# Patient Record
Sex: MALE | Race: ASIAN | NOT HISPANIC OR LATINO | URBAN - METROPOLITAN AREA
[De-identification: names, ages, dates, MRNs, and addresses within clinical notes are randomized per-mention and may not be internally consistent; named-entity substitution may affect disease eponyms.]

---

## 2019-10-12 ENCOUNTER — EMERGENCY (EMERGENCY)
Facility: HOSPITAL | Age: 49
LOS: 1 days | Discharge: HOME | End: 2019-10-12
Attending: EMERGENCY MEDICINE | Admitting: EMERGENCY MEDICINE
Payer: MEDICAID

## 2019-10-12 ENCOUNTER — EMERGENCY (EMERGENCY)
Facility: HOSPITAL | Age: 49
LOS: 1 days | Discharge: ANOTHER HOSPITAL | End: 2019-10-12
Attending: EMERGENCY MEDICINE
Payer: SELF-PAY

## 2019-10-12 VITALS
DIASTOLIC BLOOD PRESSURE: 81 MMHG | SYSTOLIC BLOOD PRESSURE: 131 MMHG | OXYGEN SATURATION: 100 % | TEMPERATURE: 98 F | RESPIRATION RATE: 16 BRPM | HEART RATE: 55 BPM

## 2019-10-12 VITALS
TEMPERATURE: 98 F | HEART RATE: 62 BPM | SYSTOLIC BLOOD PRESSURE: 138 MMHG | OXYGEN SATURATION: 100 % | DIASTOLIC BLOOD PRESSURE: 76 MMHG | RESPIRATION RATE: 16 BRPM

## 2019-10-12 VITALS
OXYGEN SATURATION: 99 % | HEART RATE: 60 BPM | TEMPERATURE: 98 F | RESPIRATION RATE: 16 BRPM | SYSTOLIC BLOOD PRESSURE: 132 MMHG | DIASTOLIC BLOOD PRESSURE: 87 MMHG

## 2019-10-12 VITALS
TEMPERATURE: 98 F | RESPIRATION RATE: 20 BRPM | DIASTOLIC BLOOD PRESSURE: 72 MMHG | OXYGEN SATURATION: 98 % | HEIGHT: 72 IN | HEART RATE: 80 BPM | SYSTOLIC BLOOD PRESSURE: 149 MMHG | WEIGHT: 160.06 LBS

## 2019-10-12 PROCEDURE — 99284 EMERGENCY DEPT VISIT MOD MDM: CPT

## 2019-10-12 PROCEDURE — 99285 EMERGENCY DEPT VISIT HI MDM: CPT

## 2019-10-12 PROCEDURE — 99283 EMERGENCY DEPT VISIT LOW MDM: CPT

## 2019-10-12 RX ORDER — CYCLOPENTOLATE HYDROCHLORIDE 10 MG/ML
1 SOLUTION/ DROPS OPHTHALMIC
Qty: 1 | Refills: 0
Start: 2019-10-12 | End: 2019-10-21

## 2019-10-12 RX ORDER — VALACYCLOVIR 500 MG/1
1 TABLET, FILM COATED ORAL
Qty: 30 | Refills: 0
Start: 2019-10-12 | End: 2019-10-21

## 2019-10-12 RX ORDER — CYCLOPENTOLATE HYDROCHLORIDE 10 MG/ML
1 SOLUTION/ DROPS OPHTHALMIC ONCE
Refills: 0 | Status: COMPLETED | OUTPATIENT
Start: 2019-10-12 | End: 2019-10-12

## 2019-10-12 RX ORDER — VALACYCLOVIR 500 MG/1
1000 TABLET, FILM COATED ORAL ONCE
Refills: 0 | Status: COMPLETED | OUTPATIENT
Start: 2019-10-12 | End: 2019-10-12

## 2019-10-12 NOTE — ED PROVIDER NOTE - ATTENDING CONTRIBUTION TO CARE
Dr. Pina:  I have personally performed a face to face bedside history and physical examination of this patient. I have discussed the history, examination, review of systems, assessment and plan of management with the resident. I have reviewed the electronic medical record and amended it to reflect my history, review of systems, physical exam, assessment and plan.    49M denies pmh transferred from [facility] for ophthalmology evaluation.  Pt c/o 3 days of right eye pain, redness, and decreased vision.      Exam:  - nad  - +injected right eye, states he cannot see out of eye, IOP 28, hazy  - rrr  - ctab  - no focal neuro deficits    A/P  - concern for acute angle closure glaucoma vs infection  - ophthalmology consulted

## 2019-10-12 NOTE — ED PROVIDER NOTE - OBJECTIVE STATEMENT
49M presents with a cc of vision changes to R eye reports over last x3 days has noted redness to eye a/w blurry vision able to see dark shapes feels as if something is stabbing him in eye, was seen at OSH earlier today for same cc transferred to [facility] for optho eval. Also c/o L sided headache, Denies n/v/f/c/cp/sob. Denies syncope, lightheadedness, dizziness. Denies chest palpitations, abdominal pain. Denies dysuria, hematuria, hematochezia, BRBPR, tarry stools, diarrhea, constipation.

## 2019-10-12 NOTE — ED PROVIDER NOTE - EYE, RIGHT
injected conjunctiva, increased tear production, right cornea is hazy, EOMI, PERRL, no fluttering uptake. Pressure in the right eye between 8 - 11 and left eye is 6

## 2019-10-12 NOTE — ED ADULT TRIAGE NOTE - CHIEF COMPLAINT QUOTE
brought by EMS as transfer from [place] for eye irritation right eye x 2 days with vision changes. Here to see ophthalmology. Describes pain to eye as burning and scratching

## 2019-10-12 NOTE — ED PROVIDER NOTE - PHYSICAL EXAMINATION
GEN APPEARANCE: WDWN, alert and cooperative, non-toxic appearing and in NAD  HEAD: Atraumatic, normocephalic   EYES: R eye cloudy w conjunctival injection, conjunctival injection, no pain w EOM pressure 28mmhg, unaffected eye appears normal pressure 10mmhg,   NOSE: No nasal discharge, no external evidence of epistaxis.   NECK: Supple  CV: RRR, S1S2, no c/r/m/g. No cyanosis or pallor. Extremities warm, well perfused. Cap refill <2 seconds. No bruits.   LUNGS: CTAB. No wheezing. No rales. No rhonchi. No diminished breath sounds.   ABDOMEN: Soft, NTND. No guarding or rebound. No masses.   MSK: Spine appears normal, no spine point tenderness. No CVA ttp. No joint erythema or tenderness. Normal muscular development. Pelvis stable.  EXTREMITIES: No peripheral edema. No obvious joint or bony deformity.  NEURO: Alert, follows commands. Sensation and motor normal x4 extremities.   SKIN: Normal color for race, warm, dry and intact. No evidence of rash.  PSYCH: Normal mood and affect. GEN APPEARANCE: WDWN, alert and cooperative, non-toxic appearing and in NAD  HEAD: Atraumatic, normocephalic   EYES: R eye cloudy w conjunctival injection, conjunctival injection, no pain w EOM pressure 28mmhg, L unaffected eye appears normal pressure 10mmhg, R eye minimally reactive   NOSE: No nasal discharge, no external evidence of epistaxis.   NECK: Supple  CV: RRR, S1S2, no c/r/m/g. No cyanosis or pallor. Extremities warm, well perfused. Cap refill <2 seconds. No bruits.   LUNGS: CTAB. No wheezing. No rales. No rhonchi. No diminished breath sounds.   ABDOMEN: Soft, NTND. No guarding or rebound. No masses.   MSK: Spine appears normal, no spine point tenderness. No CVA ttp. No joint erythema or tenderness. Normal muscular development. Pelvis stable.  EXTREMITIES: No peripheral edema. No obvious joint or bony deformity.  NEURO: Alert, follows commands. Sensation and motor normal x4 extremities.   SKIN: Normal color for race, warm, dry and intact. No evidence of rash.  PSYCH: Normal mood and affect.

## 2019-10-12 NOTE — ED PROVIDER NOTE - PROGRESS NOTE DETAILS
Discussed with Dr. Briseno (Opthalmology) will transfer for further eval as patient wishes to have immediate Opthal eval.

## 2019-10-12 NOTE — ED ADULT NURSE NOTE - NSIMPLEMENTINTERV_GEN_ALL_ED
Implemented All Universal Safety Interventions:  [place] to call system. Call bell, personal items and telephone within reach. Instruct patient to call for assistance. Room bathroom lighting operational. Non-slip footwear when patient is off stretcher. Physically safe environment: no spills, clutter or unnecessary equipment. Stretcher in lowest position, wheels locked, appropriate side rails in place.

## 2019-10-12 NOTE — ED PROVIDER NOTE - CLINICAL SUMMARY MEDICAL DECISION MAKING FREE TEXT BOX
Steve PGY3: 49M presents with a cc of vision changes to R eye reports over last x3 days has noted redness to eye a/w blurry vision able to see dark shapes feels as if something is stabbing him in eye, was seen at OSH earlier today for same cc transferred to [facility] for optho eval. exam vss non toxic appearing R eye findings as above c/f acute angle glaucoma vs viral conjunctivitis less likely endophthalmitis however will get urgent optho consult, reassess

## 2019-10-12 NOTE — ED PROVIDER NOTE - CLINICAL SUMMARY MEDICAL DECISION MAKING FREE TEXT BOX
Patient with right eye irritation, tear production, discharge due to cornea appearance. Will consult with opthalmology.

## 2019-10-12 NOTE — CONSULT NOTE ADULT - ASSESSMENT
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     Herpetic keratojunctivitis   - recommend valtrex 1000mg TID PO for 10 days  - Recommend cyclopentolate 1% one drop tid to the right eye  - recommend preservative free artificial tears, one drop to both eyes, every 2 hours  - given epithelial disease, holding topical steroids for now    Outpatient follow-up: Patient should follow-up with [facility] Department of Ophthalmology on Monday at  [address]  [place] [address]  [phone]    Mynor Hsieh MD, PGY-2  Pager: [phone]/[facility]: [id number]    Patient was seen and discussed with PGY-III Dr. Bonner

## 2019-10-12 NOTE — CONSULT NOTE ADULT - SUBJECTIVE AND OBJECTIVE BOX
[facility] DEPARTMENT OF OPHTHALMOLOGY - INITIAL ADULT CONSULT  -----------------------------------------------------------------------------------------------------------------  Mynor Hsieh MD PGY-2  Pager: [phone]/[facility]: [id number]  -----------------------------------------------------------------------------------------------------------------    HPI:  49y male w/ no PMH reports 3 days of R eye redness, progressive blurriness. Did not try any meds. Presented to [facility] for concern of eye redness, decreased vision, and "sticking" pain. Denies flashes of light/floaters.       PAST MEDICAL & SURGICAL HISTORY:  No pertinent past medical history  No significant past surgical history    Past Ocular History: denies sx/lasers    FAMILY HISTORY: denies glaucoma     Social History: accompanied by daughter in law and grandkid    Ophthalmic Medications: denies    Allergies & Intolerances: NKDA    Review of Systems:  Constitutional: No fever, chills  Eyes: No floaters, discharge, double vision, OU  Neuro: No tremors  Cardiovascular: No chest pain, palpitations  Respiratory: No SOB, no cough  GI: No nausea, vomiting, abdominal pain  : No dysuria  Skin: no rash  Psych: no depression  Endocrine: no polyuria, polydipsia  Heme/lymph: no swelling    VITALS: T(C): 36.9 (10-12-19 @ 21:53)  T(F): 98.4 (10-12-19 @ 21:53), Max: 98.4 (10-12-19 @ 21:53)  HR: 62 (10-12-19 @ 21:53) (60 - 62)  BP: 138/76 (10-12-19 @ 21:53) (132/87 - 138/76)  RR:  (16 - 16)  SpO2:  (99% - 100%)  Wt(kg): --  General: AAO x 3, appropriate mood and affect    Ophthalmology Exam:  Visual acuity (sc): 20/400 PHNI OD 20/30 OS  Pupils: PERRL OU, no APD  Ttono: 19 OU  Extraocular movements (EOMs): Full OU, no pain, no diplopia  Confrontational Visual Field (CVF): Full OU  Color Plates: LACIE 2/2 decreased VA OD 12/12 OS    Slit Ariane Exam (SLE)  External: WNL OU  Lids/Lashes/Lacrimal Ducts: WNL OU    Sclera/Conjunctiva: 2+ injection, no membranes on lid eversion or follicles OD W+Q OS  Cornea: 2+ PEEs 2+ stromal haze and Descemet's folds, 1+ pigmented KPs OD Cl OS  Anterior Chamber: 2+ flare, unable to visualize cell due to corneal haze OD  white and quiet OS  Iris: Round and Reactive OU  Lens: 1+ NS  OU    Fundus Exam: dilated with 1% tropicamide and 2.5% phenylephrine  Approval obtained from primary team for dilation  Patient aware that pupils can remained dilated for at least 4-6 hours  Exam performed with 20D lens    OD view slightly obscured from corneal haze but able to assess fundus  Vitreous: wnl OU  Disc, cup/disc: sharp and pink, 0.4 OU  Macula: wnl OU  Vessels: wnl OU  Periphery: wnl OU

## 2019-10-12 NOTE — ED ADULT TRIAGE NOTE - CHIEF COMPLAINT QUOTE
came in w/ c/o rt eye pain and redness. denies any injury came in w/ c/o rt eye pain and redness.tearing on his eye. denies any injury.

## 2019-10-12 NOTE — ED ADULT NURSE NOTE - OBJECTIVE STATEMENT
48y/o male aaox4 and ambulatory transferred from [place] c/o R eye pain. Pt recently returned from [facility] yesterday. Pt states x3 days ago eye started to become itchy and watery. Pt states the itchiness turned into sharp pains to now the pt is unable to open the eye. Pt states his vision is dark if he tries to open it. Pt denies trauma/injury to the eye or face. Pt denies chest pain, SOB, N/V/D, palpitations, diaphoresis. Pt states sometimes feeling lightheaded and intermittent HA; pt states it hurts d/t the L eye doing all the work. Vital signs as noted. Pt family at bedside. Call light within reach. Will continue to monitor.

## 2019-10-12 NOTE — ED PROVIDER NOTE - OBJECTIVE STATEMENT
50 y/o M patient with no significant PMHx and no significant PSHx presents to the ED with c/o 3d of irritation in the right eye, increased tear production, discharge in the right eye. Patient reports that his eye has been red and has blurry vision. Patient states having no pain, just mild irritation. Patient denies any fever, nausea and vomiting, pain with movement of eye, foreign body sensation. Allergies: NKDA

## 2019-10-12 NOTE — ED PROVIDER NOTE - NSFOLLOWUPINSTRUCTIONS_ED_ALL_ED_FT
Herpetic keratojunctivitis   - recommend valtrex 1000mg three times a day for 10 days  - Recommend cyclopentolate 1% one drop three times a day to the right eye  - recommend preservative free artificial tears, one drop to both eyes, every 2 hours      Outpatient follow-up:   [facility] Department of Ophthalmology on Monday at  [address]  [place] [address]  [phone]

## 2019-10-12 NOTE — ED ADULT NURSE NOTE - CHIEF COMPLAINT QUOTE
brought by EMS as transfer from [place] for eye irritation right eye x 2 days with vision changes. Here to see ophthalmology. Describes pain to eye as burning and scratching

## 2019-10-12 NOTE — ED PROVIDER NOTE - NSFOLLOWUPCLINICS_GEN_ALL_ED_FT
[facility] - Ophthalmology  Ophthalmology  [address]  [place] [address]  Phone: [phone]  Fax:   Follow Up Time:

## 2019-10-12 NOTE — ED PROVIDER NOTE - PATIENT PORTAL LINK FT
You can access the FollowMyHealth Patient Portal offered by [facility] by registering at the following website: [email or web address]. By joining [employer]’s FollowMyHealth portal, you will also be able to view your health information using other applications (apps) compatible with our system.

## 2019-10-13 RX ADMIN — CYCLOPENTOLATE HYDROCHLORIDE 1 DROP(S): 10 SOLUTION/ DROPS OPHTHALMIC at 00:21

## 2019-10-13 RX ADMIN — VALACYCLOVIR 1000 MILLIGRAM(S): 500 TABLET, FILM COATED ORAL at 00:21

## 2019-10-14 PROBLEM — Z00.00 ENCOUNTER FOR PREVENTIVE HEALTH EXAMINATION: Status: ACTIVE | Noted: 2019-10-14

## 2019-10-14 PROBLEM — Z78.9 OTHER SPECIFIED HEALTH STATUS: Chronic | Status: ACTIVE | Noted: 2019-10-12

## 2019-10-15 ENCOUNTER — APPOINTMENT (OUTPATIENT)
Dept: OPHTHALMOLOGY | Facility: CLINIC | Age: 49
End: 2019-10-15

## 2019-10-15 PROBLEM — Z78.9 OTHER SPECIFIED HEALTH STATUS: Chronic | Status: ACTIVE | Noted: 2019-10-12

## 2019-10-17 ENCOUNTER — APPOINTMENT (OUTPATIENT)
Dept: OPHTHALMOLOGY | Facility: CLINIC | Age: 49
End: 2019-10-17

## 2019-10-21 ENCOUNTER — APPOINTMENT (OUTPATIENT)
Dept: OPHTHALMOLOGY | Facility: CLINIC | Age: 49
End: 2019-10-21

## 2019-10-29 ENCOUNTER — APPOINTMENT (OUTPATIENT)
Dept: OPHTHALMOLOGY | Facility: CLINIC | Age: 49
End: 2019-10-29
